# Patient Record
Sex: FEMALE | Race: WHITE | NOT HISPANIC OR LATINO
[De-identification: names, ages, dates, MRNs, and addresses within clinical notes are randomized per-mention and may not be internally consistent; named-entity substitution may affect disease eponyms.]

---

## 2018-05-01 PROBLEM — Z00.00 ENCOUNTER FOR PREVENTIVE HEALTH EXAMINATION: Status: ACTIVE | Noted: 2018-05-01

## 2018-05-02 ENCOUNTER — APPOINTMENT (OUTPATIENT)
Dept: DERMATOLOGY | Facility: CLINIC | Age: 68
End: 2018-05-02
Payer: MEDICARE

## 2018-05-02 PROCEDURE — 99214 OFFICE O/P EST MOD 30 MIN: CPT | Mod: 25

## 2018-05-02 PROCEDURE — 17110 DESTRUCTION B9 LES UP TO 14: CPT

## 2020-08-19 ENCOUNTER — APPOINTMENT (OUTPATIENT)
Dept: OBGYN | Facility: CLINIC | Age: 70
End: 2020-08-19
Payer: MEDICARE

## 2020-08-19 VITALS
SYSTOLIC BLOOD PRESSURE: 154 MMHG | BODY MASS INDEX: 27.79 KG/M2 | WEIGHT: 151 LBS | HEIGHT: 62 IN | DIASTOLIC BLOOD PRESSURE: 89 MMHG

## 2020-08-19 DIAGNOSIS — Z83.3 FAMILY HISTORY OF DIABETES MELLITUS: ICD-10-CM

## 2020-08-19 DIAGNOSIS — Z82.49 FAMILY HISTORY OF ISCHEMIC HEART DISEASE AND OTHER DISEASES OF THE CIRCULATORY SYSTEM: ICD-10-CM

## 2020-08-19 DIAGNOSIS — Z86.39 PERSONAL HISTORY OF OTHER ENDOCRINE, NUTRITIONAL AND METABOLIC DISEASE: ICD-10-CM

## 2020-08-19 DIAGNOSIS — Z87.42 PERSONAL HISTORY OF OTHER DISEASES OF THE FEMALE GENITAL TRACT: ICD-10-CM

## 2020-08-19 DIAGNOSIS — I10 ESSENTIAL (PRIMARY) HYPERTENSION: ICD-10-CM

## 2020-08-19 DIAGNOSIS — Z78.9 OTHER SPECIFIED HEALTH STATUS: ICD-10-CM

## 2020-08-19 PROCEDURE — 99203 OFFICE O/P NEW LOW 30 MIN: CPT | Mod: 25

## 2020-08-19 PROCEDURE — G0101: CPT

## 2020-08-23 ENCOUNTER — TRANSCRIPTION ENCOUNTER (OUTPATIENT)
Age: 70
End: 2020-08-23

## 2020-08-25 LAB — CYTOLOGY CVX/VAG DOC THIN PREP: ABNORMAL

## 2020-11-12 PROBLEM — Z87.42 HISTORY OF ABNORMAL CERVICAL PAP SMEAR: Status: RESOLVED | Noted: 2020-08-19 | Resolved: 2020-11-12

## 2020-11-12 PROBLEM — Z86.39 HISTORY OF HYPERCHOLESTEROLEMIA: Status: RESOLVED | Noted: 2020-08-19 | Resolved: 2020-11-12

## 2020-11-12 NOTE — HISTORY OF PRESENT ILLNESS
[FreeTextEntry1] : 70 year old female presents for wwe.  She is a new patient to our practice.  Her last gyn exam was in .  She is unsure of the age of menarche.  Menopause was in her 50's.  She denies menopausal symptoms.  She denies bowel symptoms.  She does admit to stress incontinence.  She denies further work up or treatment at this time.  She has a history of an abnormal pap and cryosurgery to her cervix.  She denies ovarian cysts, fibroids, endometriosis, STD's or abnormal pap's.  Her last mammo and dexa were in .  She declines further bone density testing as she would not take meds.  Her last colonoscopy was in 2019 and she states she is due this year.    \par \par PMH is significant for HTN and hypercholesterolemia.  PSH is significant for a rotator cuff tear.  Family history is non-contributory.  She denies tobacco, alcohol and illicit drugs.  OB history  (FT VD x 3, one stillborn at 38 weeks).  Gyn history as above.  She takes crestor, probiotics and amlodipine.  NKDA.

## 2020-11-12 NOTE — PHYSICAL EXAM
[Appropriately responsive] : appropriately responsive [Alert] : alert [No Acute Distress] : no acute distress [No Lymphadenopathy] : no lymphadenopathy [Soft] : soft [Non-tender] : non-tender [Non-distended] : non-distended [No HSM] : No HSM [No Lesions] : no lesions [No Mass] : no mass [Oriented x3] : oriented x3 [Examination Of The Breasts] : a normal appearance [No Masses] : no breast masses were palpable [Labia Majora] : normal [Labia Minora] : normal [Atrophy] : atrophy [Dry Mucosa] : dry mucosa [Normal] : normal [Uterine Adnexae] : normal

## 2022-04-27 ENCOUNTER — APPOINTMENT (OUTPATIENT)
Dept: DERMATOLOGY | Facility: CLINIC | Age: 72
End: 2022-04-27
Payer: MEDICARE

## 2022-04-27 PROCEDURE — 99204 OFFICE O/P NEW MOD 45 MIN: CPT

## 2022-05-11 ENCOUNTER — APPOINTMENT (OUTPATIENT)
Dept: OBGYN | Facility: CLINIC | Age: 72
End: 2022-05-11
Payer: MEDICARE

## 2022-05-11 VITALS
HEIGHT: 62 IN | BODY MASS INDEX: 28.34 KG/M2 | SYSTOLIC BLOOD PRESSURE: 146 MMHG | DIASTOLIC BLOOD PRESSURE: 85 MMHG | WEIGHT: 154 LBS

## 2022-05-11 PROCEDURE — G0101: CPT

## 2022-06-10 NOTE — PLAN
[FreeTextEntry1] : 72 year old female wwe\par \par 1. Patient with history of abnormal pap's and cryosurgery to the cervix.  Will continue pap's every 2 years.  She is not yet due for her Pap.  She will return to the office in August for her Pap.\par 2. Rx screening mammo\par 3. Patient declines further bone density as she would not proceed with treatment\par 4. Due for screening colonoscopy\par 5. Counseled on stress incontinence.  Declines further work up.  Counseled on kegel exercises.  Counseled on pelvic floor PT.\par 6.  Cystocele and rectocele noted on pelvic exam.  She is currently asymptomatic and declines further treatment.\par 7.  Annual exam in 1 year

## 2022-06-10 NOTE — HISTORY OF PRESENT ILLNESS
[FreeTextEntry1] : 72 year old female presents for wwe.  She has no complaints today.  Menopause was in her 50's.  She denies menopausal symptoms.  She denies bowel symptoms.  She does admit to stress incontinence.  She denies further work up or treatment at this time.  She has a history of an abnormal pap and cryosurgery to her cervix.  She has no other significant gyn history.  She is due for her mammo.  She declines further bone density testing as she would not take meds.  Her last colonoscopy was in 2019 and she states she is overdue.  She would like to continue Paps every 2 years as she has a history of abnormal Paps and cryosurgery to her cervix.  It has not yet been 2 years since she had her Pap done.  We discussed this with the billing department today and it would not be covered.  She will come back in August to have her Pap done.   \par \par PMH is significant for HTN and hypercholesterolemia.  PSH is significant for a rotator cuff tear.  Family history is non-contributory.  She denies tobacco, alcohol and illicit drugs.  OB history  (FT VD x 3, one stillborn at 38 weeks).  Gyn history as above.  She takes crestor, probiotics and amlodipine.  NKDA.

## 2022-06-10 NOTE — PHYSICAL EXAM
[Chaperone Declined] : Patient declined chaperone [Appropriately responsive] : appropriately responsive [Alert] : alert [No Acute Distress] : no acute distress [No Lymphadenopathy] : no lymphadenopathy [Regular Rate Rhythm] : regular rate rhythm [No Murmurs] : no murmurs [Clear to Auscultation B/L] : clear to auscultation bilaterally [Soft] : soft [Non-tender] : non-tender [Non-distended] : non-distended [No HSM] : No HSM [No Lesions] : no lesions [No Mass] : no mass [Oriented x3] : oriented x3 [Examination Of The Breasts] : a normal appearance [No Masses] : no breast masses were palpable [Labia Majora] : normal [Labia Minora] : normal [Atrophy] : atrophy [Cystocele] : a cystocele [Dry Mucosa] : dry mucosa [Rectocele] : a rectocele [Normal] : normal [Uterine Adnexae] : normal

## 2022-06-17 ENCOUNTER — APPOINTMENT (OUTPATIENT)
Dept: ORTHOPEDIC SURGERY | Facility: CLINIC | Age: 72
End: 2022-06-17
Payer: MEDICARE

## 2022-06-17 VITALS
HEART RATE: 91 BPM | DIASTOLIC BLOOD PRESSURE: 87 MMHG | BODY MASS INDEX: 28.34 KG/M2 | SYSTOLIC BLOOD PRESSURE: 137 MMHG | WEIGHT: 154 LBS | HEIGHT: 62 IN

## 2022-06-17 DIAGNOSIS — M16.11 UNILATERAL PRIMARY OSTEOARTHRITIS, RIGHT HIP: ICD-10-CM

## 2022-06-17 DIAGNOSIS — M25.552 PAIN IN LEFT HIP: ICD-10-CM

## 2022-06-17 PROCEDURE — 73502 X-RAY EXAM HIP UNI 2-3 VIEWS: CPT | Mod: RT

## 2022-06-17 PROCEDURE — 99205 OFFICE O/P NEW HI 60 MIN: CPT

## 2022-06-17 NOTE — ADDENDUM
[FreeTextEntry1] : This note was authored by Suhas Paul working as a medical scribe for Dr. Duane Negron. The note was reviewed, edited, and revised by Dr. Duane Negron whom is in agreement with the exam findings, imaging findings, and treatment plan. 06/17/2022

## 2022-06-17 NOTE — PHYSICAL EXAM
[de-identified] : The patient appears well nourished  and in no apparent distress.  The patient is alert and oriented to person, place, and time.   Affect and mood appear normal. The head is normocephalic and atraumatic.  The eyes reveal normal sclera and extra ocular muscles are intact. The tongue is midline with no apparent lesions.  Skin shows normal turgor with no evidence of eczema or psoriasis.  No respiratory distress noted.  Sensation grossly intact.		  [de-identified] : Exam of the left hip shows hip flexion of 80 degrees, hip external rotation of 20 degrees, hip internal rotation of 5 degrees,hip abduction of less than 10 degrees. The patient walks with and antalgic gait. The patient's left leg is 1 cm shorter than her right.\par  5/5 motor strength bilaterally distally. Sensation intact distally.		  [de-identified] : X-ray: AP view of the pelvis and 2 views of the left hip demonstrate bone on bone arthritis.

## 2022-06-17 NOTE — HISTORY OF PRESENT ILLNESS
[de-identified] : Ms. MARCO ANTONIO TELLEZ is a 72 year old female presenting for evaluation of longstanding left hip pain now worsening. Patient localizes the pain to the groin and notes it is worse with weightbearing activity including exercise. She also has worsening stiffness which is her biggest concern. She is limited in movements she can do while exercising. She can no longer put on socks and shoes. She has not had injections but has tried PT without improvement. Patient takes OTC NSAIDs and Tylenol without improvement.

## 2022-06-17 NOTE — CONSULT LETTER
[Dear  ___] : Dear  [unfilled], [Consult Letter:] : I had the pleasure of evaluating your patient, [unfilled]. [Please see my note below.] : Please see my note below. [Consult Closing:] : Thank you very much for allowing me to participate in the care of this patient.  If you have any questions, please do not hesitate to contact me. [Sincerely,] : Sincerely, [FreeTextEntry2] : HATTIE LACEY MD\par  [FreeTextEntry3] : Duane Negron MD\par Chief of Joint Replacement\par [facility] Replacement \par [facility]\par \par

## 2022-06-17 NOTE — DISCUSSION/SUMMARY
[de-identified] : MARCO ANTONIO TELLEZ is a 72 year female who presents with left hip bone on bone arthritis. Based upon the patients continued symptoms and failure to respond to conservative treatment I have recommended a left total hip replacement for the patient. A discussion was had with the patient regarding a left total hip replacement. Anterior and posterior exposures were discussed. For this patient an anterior approach is appropriate. A long discussion was had with the patient as what the total joint replacement would entail. A model was used to demonstrate the operation and to discuss bearing surfaces of the implants. The hospitalization and rehabilitation were discussed. The use of perioperative antibiotics and DVT prophylaxis were discussed. The risks, benefits and alternatives to surgical intervention were discussed at length with the patient. Specific risks discussed included: infection, wound breakdown, numbness and damage to nerves, tendon, muscle, arteries or other blood vessels, and the possibility of leg length discrepancy. The possibility of recurrent pain, no improvement in pain and actual worsening of the pain were also mentioned in conversation with the patient. Medical complications related to the patient's general medical health including deep vein thrombosis, pulmonary embolism, heart attack, stroke, death and other complications from anesthesia were discussed as well. The patient was told that we will take steps to minimize these risks by using sterile technique, antibiotics and DVT prophylaxis when appropriate and following the patient postoperatively in the clinic setting to monitor progress. The benefits of surgery were discussed with the patient including the potential to improve the current clinical condition through operative intervention. Alternatives to surgical intervention include continued conservative management which may yield less than optimal results in this particular patient. All questions were answered to the satisfaction of the patient. Models were used as an educational tool. We did discuss implant choices and fixation, with shared decision making with the patient.\par  \par We are planning for robotic assistance for the total hip replacement. We discussed that this will require placement of iliac crest pins in the contralateral iliac crest for pelvic bone registration to allow for robotic guidance for the surgery. We will utilize robotic assistance to improve accuracy of the implants, and accurate restoration of both leg lengths and femoral offset.

## 2022-07-22 ENCOUNTER — OUTPATIENT (OUTPATIENT)
Dept: OUTPATIENT SERVICES | Facility: HOSPITAL | Age: 72
LOS: 1 days | End: 2022-07-22

## 2022-07-22 ENCOUNTER — APPOINTMENT (OUTPATIENT)
Dept: CT IMAGING | Facility: CLINIC | Age: 72
End: 2022-07-22

## 2022-07-22 DIAGNOSIS — M16.11 UNILATERAL PRIMARY OSTEOARTHRITIS, RIGHT HIP: ICD-10-CM

## 2022-07-22 PROCEDURE — 73700 CT LOWER EXTREMITY W/O DYE: CPT | Mod: 26,LT

## 2022-08-09 ENCOUNTER — OUTPATIENT (OUTPATIENT)
Dept: OUTPATIENT SERVICES | Facility: HOSPITAL | Age: 72
LOS: 1 days | End: 2022-08-09
Payer: MEDICARE

## 2022-08-09 VITALS
OXYGEN SATURATION: 98 % | HEART RATE: 70 BPM | RESPIRATION RATE: 18 BRPM | TEMPERATURE: 98 F | HEIGHT: 62 IN | SYSTOLIC BLOOD PRESSURE: 120 MMHG | DIASTOLIC BLOOD PRESSURE: 60 MMHG | WEIGHT: 150.13 LBS

## 2022-08-09 DIAGNOSIS — Z98.890 OTHER SPECIFIED POSTPROCEDURAL STATES: Chronic | ICD-10-CM

## 2022-08-09 DIAGNOSIS — Z01.818 ENCOUNTER FOR OTHER PREPROCEDURAL EXAMINATION: ICD-10-CM

## 2022-08-09 DIAGNOSIS — M16.12 UNILATERAL PRIMARY OSTEOARTHRITIS, LEFT HIP: ICD-10-CM

## 2022-08-09 DIAGNOSIS — I10 ESSENTIAL (PRIMARY) HYPERTENSION: ICD-10-CM

## 2022-08-09 DIAGNOSIS — Z29.9 ENCOUNTER FOR PROPHYLACTIC MEASURES, UNSPECIFIED: ICD-10-CM

## 2022-08-09 DIAGNOSIS — Z91.89 OTHER SPECIFIED PERSONAL RISK FACTORS, NOT ELSEWHERE CLASSIFIED: ICD-10-CM

## 2022-08-09 LAB
A1C WITH ESTIMATED AVERAGE GLUCOSE RESULT: 5.8 % — HIGH (ref 4–5.6)
ANION GAP SERPL CALC-SCNC: 10 MMOL/L — SIGNIFICANT CHANGE UP (ref 5–17)
APTT BLD: 30.9 SEC — SIGNIFICANT CHANGE UP (ref 27.5–35.5)
BASOPHILS # BLD AUTO: 0.06 K/UL — SIGNIFICANT CHANGE UP (ref 0–0.2)
BASOPHILS NFR BLD AUTO: 0.7 % — SIGNIFICANT CHANGE UP (ref 0–2)
BLD GP AB SCN SERPL QL: SIGNIFICANT CHANGE UP
BUN SERPL-MCNC: 16.7 MG/DL — SIGNIFICANT CHANGE UP (ref 8–20)
CALCIUM SERPL-MCNC: 9.5 MG/DL — SIGNIFICANT CHANGE UP (ref 8.4–10.5)
CHLORIDE SERPL-SCNC: 99 MMOL/L — SIGNIFICANT CHANGE UP (ref 98–107)
CO2 SERPL-SCNC: 26 MMOL/L — SIGNIFICANT CHANGE UP (ref 22–29)
CREAT SERPL-MCNC: 0.85 MG/DL — SIGNIFICANT CHANGE UP (ref 0.5–1.3)
EGFR: 73 ML/MIN/1.73M2 — SIGNIFICANT CHANGE UP
EOSINOPHIL # BLD AUTO: 0.5 K/UL — SIGNIFICANT CHANGE UP (ref 0–0.5)
EOSINOPHIL NFR BLD AUTO: 6.1 % — HIGH (ref 0–6)
ESTIMATED AVERAGE GLUCOSE: 120 MG/DL — HIGH (ref 68–114)
GLUCOSE SERPL-MCNC: 81 MG/DL — SIGNIFICANT CHANGE UP (ref 70–99)
HCT VFR BLD CALC: 38.2 % — SIGNIFICANT CHANGE UP (ref 34.5–45)
HGB BLD-MCNC: 12.9 G/DL — SIGNIFICANT CHANGE UP (ref 11.5–15.5)
IMM GRANULOCYTES NFR BLD AUTO: 0.4 % — SIGNIFICANT CHANGE UP (ref 0–1.5)
INR BLD: 0.97 RATIO — SIGNIFICANT CHANGE UP (ref 0.88–1.16)
LYMPHOCYTES # BLD AUTO: 1.59 K/UL — SIGNIFICANT CHANGE UP (ref 1–3.3)
LYMPHOCYTES # BLD AUTO: 19.4 % — SIGNIFICANT CHANGE UP (ref 13–44)
MCHC RBC-ENTMCNC: 30.9 PG — SIGNIFICANT CHANGE UP (ref 27–34)
MCHC RBC-ENTMCNC: 33.8 GM/DL — SIGNIFICANT CHANGE UP (ref 32–36)
MCV RBC AUTO: 91.4 FL — SIGNIFICANT CHANGE UP (ref 80–100)
MONOCYTES # BLD AUTO: 0.74 K/UL — SIGNIFICANT CHANGE UP (ref 0–0.9)
MONOCYTES NFR BLD AUTO: 9 % — SIGNIFICANT CHANGE UP (ref 2–14)
MRSA PCR RESULT.: SIGNIFICANT CHANGE UP
NEUTROPHILS # BLD AUTO: 5.26 K/UL — SIGNIFICANT CHANGE UP (ref 1.8–7.4)
NEUTROPHILS NFR BLD AUTO: 64.4 % — SIGNIFICANT CHANGE UP (ref 43–77)
PLATELET # BLD AUTO: 384 K/UL — SIGNIFICANT CHANGE UP (ref 150–400)
POTASSIUM SERPL-MCNC: 5 MMOL/L — SIGNIFICANT CHANGE UP (ref 3.5–5.3)
POTASSIUM SERPL-SCNC: 5 MMOL/L — SIGNIFICANT CHANGE UP (ref 3.5–5.3)
PROTHROM AB SERPL-ACNC: 11.3 SEC — SIGNIFICANT CHANGE UP (ref 10.5–13.4)
RBC # BLD: 4.18 M/UL — SIGNIFICANT CHANGE UP (ref 3.8–5.2)
RBC # FLD: 12.8 % — SIGNIFICANT CHANGE UP (ref 10.3–14.5)
S AUREUS DNA NOSE QL NAA+PROBE: DETECTED
SODIUM SERPL-SCNC: 135 MMOL/L — SIGNIFICANT CHANGE UP (ref 135–145)
WBC # BLD: 8.18 K/UL — SIGNIFICANT CHANGE UP (ref 3.8–10.5)
WBC # FLD AUTO: 8.18 K/UL — SIGNIFICANT CHANGE UP (ref 3.8–10.5)

## 2022-08-09 PROCEDURE — 93010 ELECTROCARDIOGRAM REPORT: CPT

## 2022-08-09 PROCEDURE — 93005 ELECTROCARDIOGRAM TRACING: CPT

## 2022-08-09 PROCEDURE — G0463: CPT

## 2022-08-09 RX ORDER — MUPIROCIN 20 MG/G
1 OINTMENT TOPICAL
Qty: 1 | Refills: 0
Start: 2022-08-09 | End: 2022-08-13

## 2022-08-09 NOTE — H&P PST ADULT - ASSESSMENT
CAPRINI SCORE    AGE RELATED RISK FACTORS                                                             [ ] Age 41-60 years                                            (1 Point)  [ ] Age: 61-74 years                                           (2 Points)                 [ ] Age= 75 years                                                (3 Points)             DISEASE RELATED RISK FACTORS                                                       [ ] Edema in the lower extremities                 (1 Point)                     [ ] Varicose veins                                               (1 Point)                                 [ ] BMI > 25 Kg/m2                                            (1 Point)                                  [ ] Serious infection (ie PNA)                            (1 Point)                     [ ] Lung disease ( COPD, Emphysema)            (1 Point)                                                                          [ ] Acute myocardial infarction                         (1 Point)                  [ ] Congestive heart failure (in the previous month)  (1 Point)         [ ] Inflammatory bowel disease                            (1 Point)                  [ ] Central venous access, PICC or Port               (2 points)       (within the last month)                                                                [ ] Stroke (in the previous month)                        (5 Points)    [ ] Previous or present malignancy                       (2 points)                                                                                                                                                         HEMATOLOGY RELATED FACTORS                                                         [ ] Prior episodes of VTE                                     (3 Points)                     [ ] Positive family history for VTE                      (3 Points)                  [ ] Prothrombin 81008 A                                     (3 Points)                     [ ] Factor V Leiden                                                (3 Points)                        [ ] Lupus anticoagulants                                      (3 Points)                                                           [ ] Anticardiolipin antibodies                              (3 Points)                                                       [ ] High homocysteine in the blood                   (3 Points)                                             [ ] Other congenital or acquired thrombophilia      (3 Points)                                                [ ] Heparin induced thrombocytopenia                  (3 Points)                                        MOBILITY RELATED FACTORS  [ ] Bed rest                                                         (1 Point)  [ ] Plaster cast                                                    (2 points)  [ ] Bed bound for more than 72 hours           (2 Points)    GENDER SPECIFIC FACTORS  [ ] Pregnancy or had a baby within the last month   (1 Point)  [ ] Post-partum < 6 weeks                                   (1 Point)  [ ] Hormonal therapy  or oral contraception   (1 Point)  [ ] History of pregnancy complications              (1 point)  [ ] Unexplained or recurrent              (1 Point)    OTHER RISK FACTORS                                           (1 Point)  [ ] BMI >40, smoking, diabetes requiring insulin, chemotherapy  blood transfusions and length of surgery over 2 hours    SURGERY RELATED RISK FACTORS  [ ]  Section within the last month     (1 Point)  [ ] Minor surgery                                                  (1 Point)  [ ] Arthroscopic surgery                                       (2 Points)  [ ] Planned major surgery lasting more            (2 Points)      than 45 minutes     [ ] Elective hip or knee joint replacement       (5 points)       surgery                                                TRAUMA RELATED RISK FACTORS  [ ] Fracture of the hip, pelvis, or leg                       (5 Points)  [ ] Spinal cord injury resulting in paralysis             (5 points)       (in the previous month)    [ ] Paralysis  (less than 1 month)                             (5 Points)  [ ] Multiple Trauma within 1 month                        (5 Points)    Total Score [        ]    Caprini Score 0-2: Low Risk, NO VTE prophylaxis required for most patients, encourage ambulation  Caprini Score 3-6: Moderate Risk , pharmacologic VTE prophylaxis is indicated for most patients (in the absence of contraindications)  Caprini Score Greater than or =7: High risk, pharmocologic VTE prophylaxis indicated for most patients (in the absence of contraindications)                  OPIOID RISK TOOL    CLAIRE EACH BOX THAT APPLIES AND ADD TOTALS AT THE END    FAMILY HISTORY OF SUBSTANCE ABUSE                 FEMALE         MALE                                                Alcohol                             [  ]1 pt          [  ]3pts                                               Illegal Durgs                     [  ]2 pts        [  ]3pts                                               Rx Drugs                           [  ]4 pts        [  ]4 pts    PERSONAL HISTORY OF SUBSTANCE ABUSE                                                                                          Alcohol                             [  ]3 pts       [  ]3 pts                                               Illegal Durgs                     [  ]4 pts        [  ]4 pts                                               Rx Drugs                           [  ]5 pts        [  ]5 pts    AGE BETWEEN 16-45 YEARS                                      [  ]1 pt         [  ]1 pt    HISTORY OF PREADOLESCENT   SEXUAL ABUSE                                                             [  ]3 pts        [  ]0pts    PSYCHOLOGICAL DISEASE                     ADD, OCD, Bipolar, Schizophrenia        [  ]2 pts         [  ]2 pts                      Depression                                               [  ]1 pt           [  ]1 pt           SCORING TOTAL   (add numbers and type here)              (***)                                     A score of 3 or lower indicated LOW risk for future opiod abuse  A score of 4 to 7 indicated moderate risk for future opiod abuse  A score of 8 or higher indicates a high risk for opiod abuse             CAPRINI SCORE    AGE RELATED RISK FACTORS                                                             [ ] Age 41-60 years                                            (1 Point)  [ ] Age: 61-74 years                                           (2 Points)                 [ ] Age= 75 years                                                (3 Points)             DISEASE RELATED RISK FACTORS                                                       [ ] Edema in the lower extremities                 (1 Point)                     [ ] Varicose veins                                               (1 Point)                                 [ ] BMI > 25 Kg/m2                                            (1 Point)                                  [ ] Serious infection (ie PNA)                            (1 Point)                     [ ] Lung disease ( COPD, Emphysema)            (1 Point)                                                                          [ ] Acute myocardial infarction                         (1 Point)                  [ ] Congestive heart failure (in the previous month)  (1 Point)         [ ] Inflammatory bowel disease                            (1 Point)                  [ ] Central venous access, PICC or Port               (2 points)       (within the last month)                                                                [ ] Stroke (in the previous month)                        (5 Points)    [ ] Previous or present malignancy                       (2 points)                                                                                                                                                         HEMATOLOGY RELATED FACTORS                                                         [ ] Prior episodes of VTE                                     (3 Points)                     [ ] Positive family history for VTE                      (3 Points)                  [ ] Prothrombin 90662 A                                     (3 Points)                     [ ] Factor V Leiden                                                (3 Points)                        [ ] Lupus anticoagulants                                      (3 Points)                                                           [ ] Anticardiolipin antibodies                              (3 Points)                                                       [ ] High homocysteine in the blood                   (3 Points)                                             [ ] Other congenital or acquired thrombophilia      (3 Points)                                                [ ] Heparin induced thrombocytopenia                  (3 Points)                                        MOBILITY RELATED FACTORS  [ ] Bed rest                                                         (1 Point)  [ ] Plaster cast                                                    (2 points)  [ ] Bed bound for more than 72 hours           (2 Points)    GENDER SPECIFIC FACTORS  [ ] Pregnancy or had a baby within the last month   (1 Point)  [ ] Post-partum < 6 weeks                                   (1 Point)  [ ] Hormonal therapy  or oral contraception   (1 Point)  [ ] History of pregnancy complications              (1 point)  [ ] Unexplained or recurrent              (1 Point)    OTHER RISK FACTORS                                           (1 Point)  [ ] BMI >40, smoking, diabetes requiring insulin, chemotherapy  blood transfusions and length of surgery over 2 hours    SURGERY RELATED RISK FACTORS  [ ]  Section within the last month     (1 Point)  [ ] Minor surgery                                                  (1 Point)  [ ] Arthroscopic surgery                                       (2 Points)  [ ] Planned major surgery lasting more            (2 Points)      than 45 minutes     [ ] Elective hip or knee joint replacement       (5 points)       surgery                                                TRAUMA RELATED RISK FACTORS  [ ] Fracture of the hip, pelvis, or leg                       (5 Points)  [ ] Spinal cord injury resulting in paralysis             (5 points)       (in the previous month)    [ ] Paralysis  (less than 1 month)                             (5 Points)  [ ] Multiple Trauma within 1 month                        (5 Points)    Total Score [        ]    Caprini Score 0-2: Low Risk, NO VTE prophylaxis required for most patients, encourage ambulation  Caprini Score 3-6: Moderate Risk , pharmacologic VTE prophylaxis is indicated for most patients (in the absence of contraindications)  Caprini Score Greater than or =7: High risk, pharmocologic VTE prophylaxis indicated for most patients (in the absence of contraindications)                  OPIOID RISK TOOL    CLAIRE EACH BOX THAT APPLIES AND ADD TOTALS AT THE END    FAMILY HISTORY OF SUBSTANCE ABUSE                 FEMALE         MALE                                                Alcohol                             [  ]1 pt          [  ]3pts                                               Illegal Durgs                     [  ]2 pts        [  ]3pts                                               Rx Drugs                           [  ]4 pts        [  ]4 pts    PERSONAL HISTORY OF SUBSTANCE ABUSE                                                                                          Alcohol                             [  ]3 pts       [  ]3 pts                                               Illegal Durgs                     [  ]4 pts        [  ]4 pts                                               Rx Drugs                           [  ]5 pts        [  ]5 pts    AGE BETWEEN 16-45 YEARS                                      [  ]1 pt         [  ]1 pt    HISTORY OF PREADOLESCENT   SEXUAL ABUSE                                                             [  ]3 pts        [  ]0pts    PSYCHOLOGICAL DISEASE                     ADD, OCD, Bipolar, Schizophrenia        [  ]2 pts         [  ]2 pts                      Depression                                               [  ]1 pt           [  ]1 pt           SCORING TOTAL   (add numbers and type here)              (***)                                     A score of 3 or lower indicated LOW risk for future opiod abuse  A score of 4 to 7 indicated moderate risk for future opiod abuse  A score of 8 or higher indicates a high risk for opiod abuse      72 year old female pmhx of HTN, HLD, presenting for evaluation of longstanding left hip pain now worsening since 2022. She describes the pain as a stiffness, 2/10 in severity, worse with standing from a prolonged sitting position, patient localizes the pain to the groin. She is limited in movements she can do while exercising. She can no longer put on socks and shoes. She has tried PT without improvement. CT left hip 22 revealed Severe left hip osteoarthritis with probable joint bodies. Osteoarthritic changes within the right hip, partially evaluated. Partially evaluated degenerative changes within the lower lumbar spine and pubic symphysis. Patient is scheduled for left total hip replacement direct anterior approach with LAW robot on 22 with Dr Negron. Patient educated on surgical scrub, COVID testing, preadmission instructions, medical clearance and day of procedure medications, verbalizes understanding. Pt instructed to stop vitamins/supplements/herbal medications/ASA/NSAIDS for one week prior to surgery and discuss with PMD. Patient was given information on joint class, joint book provided, ERP protocol reviewed with patient, MSSA/MRSA swabbed in PST, results pending   Medical evaluation is pending        CAPRINI SCORE    AGE RELATED RISK FACTORS                                                             [ ] Age 41-60 years                                            (1 Point)  [x ] Age: 61-74 years                                           (2 Points)                 [ ] Age= 75 years                                                (3 Points)             DISEASE RELATED RISK FACTORS                                                       [ ] Edema in the lower extremities                 (1 Point)                     [ ] Varicose veins                                               (1 Point)                                 [x ] BMI > 25 Kg/m2                                            (1 Point)                                  [ ] Serious infection (ie PNA)                            (1 Point)                     [ ] Lung disease ( COPD, Emphysema)            (1 Point)                                                                          [ ] Acute myocardial infarction                         (1 Point)                  [ ] Congestive heart failure (in the previous month)  (1 Point)         [ ] Inflammatory bowel disease                            (1 Point)                  [ ] Central venous access, PICC or Port               (2 points)       (within the last month)                                                                [ ] Stroke (in the previous month)                        (5 Points)    [ ] Previous or present malignancy                       (2 points)                                                                                                                                                         HEMATOLOGY RELATED FACTORS                                                         [ ] Prior episodes of VTE                                     (3 Points)                     [ ] Positive family history for VTE                      (3 Points)                  [ ] Prothrombin 00990 A                                     (3 Points)                     [ ] Factor V Leiden                                                (3 Points)                        [ ] Lupus anticoagulants                                      (3 Points)                                                           [ ] Anticardiolipin antibodies                              (3 Points)                                                       [ ] High homocysteine in the blood                   (3 Points)                                             [ ] Other congenital or acquired thrombophilia      (3 Points)                                                [ ] Heparin induced thrombocytopenia                  (3 Points)                                        MOBILITY RELATED FACTORS  [ ] Bed rest                                                         (1 Point)  [ ] Plaster cast                                                    (2 points)  [ ] Bed bound for more than 72 hours           (2 Points)    GENDER SPECIFIC FACTORS  [ ] Pregnancy or had a baby within the last month   (1 Point)  [ ] Post-partum < 6 weeks                                   (1 Point)  [ ] Hormonal therapy  or oral contraception   (1 Point)  [ ] History of pregnancy complications              (1 point)  [ ] Unexplained or recurrent              (1 Point)    OTHER RISK FACTORS                                           (1 Point)  [ ] BMI >40, smoking, diabetes requiring insulin, chemotherapy  blood transfusions and length of surgery over 2 hours    SURGERY RELATED RISK FACTORS  [ ]  Section within the last month     (1 Point)  [ ] Minor surgery                                                  (1 Point)  [ ] Arthroscopic surgery                                       (2 Points)  [ ] Planned major surgery lasting more            (2 Points)      than 45 minutes     [x ] Elective hip or knee joint replacement       (5 points)       surgery                                                TRAUMA RELATED RISK FACTORS  [ ] Fracture of the hip, pelvis, or leg                       (5 Points)  [ ] Spinal cord injury resulting in paralysis             (5 points)       (in the previous month)    [ ] Paralysis  (less than 1 month)                             (5 Points)  [ ] Multiple Trauma within 1 month                        (5 Points)    Total Score [    8    ]    Caprini Score 0-2: Low Risk, NO VTE prophylaxis required for most patients, encourage ambulation  Caprini Score 3-6: Moderate Risk , pharmacologic VTE prophylaxis is indicated for most patients (in the absence of contraindications)  Caprini Score Greater than or =7: High risk, pharmocologic VTE prophylaxis indicated for most patients (in the absence of contraindications)                  OPIOID RISK TOOL    CLAIRE EACH BOX THAT APPLIES AND ADD TOTALS AT THE END    FAMILY HISTORY OF SUBSTANCE ABUSE                 FEMALE         MALE                                                Alcohol                             [  ]1 pt          [  ]3pts                                               Illegal Durgs                     [  ]2 pts        [  ]3pts                                               Rx Drugs                           [  ]4 pts        [  ]4 pts    PERSONAL HISTORY OF SUBSTANCE ABUSE                                                                                          Alcohol                             [  ]3 pts       [  ]3 pts                                               Illegal Durgs                     [  ]4 pts        [  ]4 pts                                               Rx Drugs                           [  ]5 pts        [  ]5 pts    AGE BETWEEN 16-45 YEARS                                      [  ]1 pt         [  ]1 pt    HISTORY OF PREADOLESCENT   SEXUAL ABUSE                                                             [  ]3 pts        [  ]0pts    PSYCHOLOGICAL DISEASE                     ADD, OCD, Bipolar, Schizophrenia        [  ]2 pts         [  ]2 pts                      Depression                                               [  ]1 pt           [  ]1 pt           SCORING TOTAL   0                                   A score of 3 or lower indicated LOW risk for future opiod abuse  A score of 4 to 7 indicated moderate risk for future opiod abuse  A score of 8 or higher indicates a high risk for opiod abuse      72 year old female pmhx of HTN, HLD, presenting for evaluation of longstanding left hip pain now worsening since 2022. She describes the pain as a stiffness, 2/10 in severity, worse with standing from a prolonged sitting position, patient localizes the pain to the groin. She is limited in movements she can do while exercising. She can no longer put on socks and shoes. She has tried PT without improvement. CT left hip 22 revealed Severe left hip osteoarthritis with probable joint bodies. Osteoarthritic changes within the right hip, partially evaluated. Partially evaluated degenerative changes within the lower lumbar spine and pubic symphysis. Patient is scheduled for left total hip replacement direct anterior approach with LAW robot on 22 with Dr Negron. Patient educated on surgical scrub, COVID testing, preadmission instructions, medical clearance and day of procedure medications, verbalizes understanding. Pt instructed to stop vitamins/supplements/herbal medications/ASA/NSAIDS for one week prior to surgery and discuss with PMD. Patient was given information on joint class, joint book provided, ERP protocol reviewed with patient, MSSA/MRSA swabbed in PST, results pending

## 2022-08-09 NOTE — H&P PST ADULT - NSICDXPASTMEDICALHX_GEN_ALL_CORE_FT
PAST MEDICAL HISTORY:  Hypertension     Unilateral osteoarthritis of hip, left      PAST MEDICAL HISTORY:  Hyperlipemia     Hypertension      (normal spontaneous vaginal delivery)     Unilateral osteoarthritis of hip, left

## 2022-08-09 NOTE — H&P PST ADULT - GASTROINTESTINAL
negative soft/nontender/nondistended/normal active bowel sounds/no guarding/no rigidity/no organomegaly

## 2022-08-09 NOTE — H&P PST ADULT - PROBLEM SELECTOR PLAN 1
Patient is scheduled for left total hip replacement direct anterior approach with LAW robot on 8/29/22 with Dr Negron. Medical evaluation is pending

## 2022-08-09 NOTE — H&P PST ADULT - ACTIVITY
unable to exercise due to pain, formerly playing tennis 3x a week, unable to play currently due to pain

## 2022-08-09 NOTE — H&P PST ADULT - MUSCULOSKELETAL
details… no calf tenderness/decreased ROM due to pain/strength 5/5 bilateral upper extremities/strength 5/5 bilateral lower extremities/abnormal gait

## 2022-08-09 NOTE — H&P PST ADULT - PROBLEM SELECTOR PLAN 2
/60 continue amlodipine as prescribed, pt takes at night, take PM prior to surgery. Medical evaluation is pending

## 2022-08-09 NOTE — H&P PST ADULT - NSICDXFAMILYHX_GEN_ALL_CORE_FT
FAMILY HISTORY:  Sibling  Still living? Unknown  FH: myocardial infarction, Age at diagnosis: Age Unknown

## 2022-08-09 NOTE — H&P PST ADULT - HEART RATE (BEATS/MIN)
70 COVID-19 swab obtained and sent to lab. Pt sitting comfortably in NAD. Will D/C and patient will F/U with results.

## 2022-08-09 NOTE — H&P PST ADULT - HISTORY OF PRESENT ILLNESS
72 year old female presenting for evaluation of longstanding left hip pain now worsening. Patient localizes the pain to the groin and notes it is worse with weightbearing activity including exercise. She also has worsening stiffness which is her biggest concern. She is limited in movements she can do while exercising. She can no longer put on socks and shoes. She has not had injections but has tried PT without improvement. Patient takes OTC NSAIDs and Tylenol without improvement.   Patient is scheduled for left total hip replacement direct anterior approach with LAW robot on 8/29/22 with Dr Negron.  Medical evaluation is pending     CT left hip 7/26/22 revealed Severe left hip osteoarthritis with probable joint bodies. Osteoarthritic   changes within the right hip, partially evaluated. Partially evaluated   degenerative changes within the lower lumbar spine and pubic symphysis.   Colonic diverticulosis. Vascular calcifications noted. 72 year old female pmhx of HTN, HLD, presenting for evaluation of longstanding left hip pain now worsening since Feb 2022. She describes the pain as a stiffness, 2/10 in severity, worse with standing from a prolonged sitting position, patient localizes the pain to the groin. She is limited in movements she can do while exercising. She can no longer put on socks and shoes. She has tried PT without improvement. CT left hip 7/26/22 revealed Severe left hip osteoarthritis with probable joint bodies. Osteoarthritic changes within the right hip, partially evaluated. Partially evaluated degenerative changes within the lower lumbar spine and pubic symphysis. Patient is scheduled for left total hip replacement direct anterior approach with LAW robot on 8/29/22 with Dr Negron.  Medical evaluation is pending

## 2022-08-09 NOTE — H&P PST ADULT - CARDIOVASCULAR
negative regular rate and rhythm/S1 S2 present/no gallops/no rub/no murmur/no JVD/normal PMI/no pedal edema

## 2022-08-28 ENCOUNTER — FORM ENCOUNTER (OUTPATIENT)
Age: 72
End: 2022-08-28

## 2022-08-28 ENCOUNTER — TRANSCRIPTION ENCOUNTER (OUTPATIENT)
Age: 72
End: 2022-08-28

## 2022-08-29 ENCOUNTER — OUTPATIENT (OUTPATIENT)
Dept: OUTPATIENT SERVICES | Facility: HOSPITAL | Age: 72
LOS: 1 days | End: 2022-08-29
Payer: MEDICARE

## 2022-08-29 ENCOUNTER — APPOINTMENT (OUTPATIENT)
Dept: ORTHOPEDIC SURGERY | Facility: HOSPITAL | Age: 72
End: 2022-08-29

## 2022-08-29 ENCOUNTER — TRANSCRIPTION ENCOUNTER (OUTPATIENT)
Age: 72
End: 2022-08-29

## 2022-08-29 DIAGNOSIS — Z98.890 OTHER SPECIFIED POSTPROCEDURAL STATES: Chronic | ICD-10-CM

## 2022-08-29 PROCEDURE — 0055T BONE SRGRY CMPTR CT/MRI IMAG: CPT | Mod: AS,LT

## 2022-08-29 PROCEDURE — 27130 TOTAL HIP ARTHROPLASTY: CPT | Mod: AS,LT

## 2022-08-29 DEVICE — SHELL ACET TRIDENT II D 48MM: Type: IMPLANTABLE DEVICE | Status: FUNCTIONAL

## 2022-08-29 DEVICE — PIN SLFTP STRL 4X170MM: Type: IMPLANTABLE DEVICE | Status: FUNCTIONAL

## 2022-08-29 DEVICE — CEMENT BONE PALACOS R W/O GENTAMICIN 1X40: Type: IMPLANTABLE DEVICE | Status: FUNCTIONAL

## 2022-08-29 DEVICE — BONE WAX: Type: IMPLANTABLE DEVICE | Status: FUNCTIONAL

## 2022-08-29 DEVICE — LINER ACET TRIDENT X3 0 DEG 36MM D: Type: IMPLANTABLE DEVICE | Status: FUNCTIONAL

## 2022-08-29 DEVICE — FEM HD CERAMIC ART BIOLOX DELTA: Type: IMPLANTABLE DEVICE | Status: FUNCTIONAL

## 2022-08-29 DEVICE — SCREW HEX LO PROF 6.5X40MM: Type: IMPLANTABLE DEVICE | Status: FUNCTIONAL

## 2022-08-29 DEVICE — SCREW HEX LO PROF 6.5X15MM: Type: IMPLANTABLE DEVICE | Status: FUNCTIONAL

## 2022-08-29 DEVICE — FEM TIB CHECKPOINT STRL: Type: IMPLANTABLE DEVICE | Status: FUNCTIONAL

## 2022-08-29 DEVICE — HEX CHECKPOINT IMPACTION STRL 3.5MM: Type: IMPLANTABLE DEVICE | Status: FUNCTIONAL

## 2022-08-29 DEVICE — STEM FEM ACTIS TAPR STD COLLAR SZ 4: Type: IMPLANTABLE DEVICE | Status: FUNCTIONAL

## 2022-08-29 RX ORDER — AMLODIPINE BESYLATE 2.5 MG/1
1 TABLET ORAL
Qty: 0 | Refills: 0 | DISCHARGE

## 2022-08-29 RX ORDER — L.ACIDOPH/B.ANIMALIS/B.LONGUM 15B CELL
1 CAPSULE ORAL
Qty: 0 | Refills: 0 | DISCHARGE

## 2022-08-29 RX ORDER — ROSUVASTATIN CALCIUM 5 MG/1
1 TABLET ORAL
Qty: 0 | Refills: 0 | DISCHARGE

## 2022-08-30 ENCOUNTER — TRANSCRIPTION ENCOUNTER (OUTPATIENT)
Age: 72
End: 2022-08-30

## 2022-08-30 PROCEDURE — 97163 PT EVAL HIGH COMPLEX 45 MIN: CPT

## 2022-08-30 PROCEDURE — 97110 THERAPEUTIC EXERCISES: CPT

## 2022-08-30 PROCEDURE — 27130 TOTAL HIP ARTHROPLASTY: CPT | Mod: LT

## 2022-08-30 PROCEDURE — 97116 GAIT TRAINING THERAPY: CPT

## 2022-08-30 PROCEDURE — C1713: CPT

## 2022-08-30 PROCEDURE — 82962 GLUCOSE BLOOD TEST: CPT

## 2022-08-30 PROCEDURE — S2900: CPT

## 2022-08-30 PROCEDURE — C1889: CPT

## 2022-08-30 PROCEDURE — 73502 X-RAY EXAM HIP UNI 2-3 VIEWS: CPT

## 2022-08-30 PROCEDURE — C1776: CPT

## 2022-08-30 PROCEDURE — 36415 COLL VENOUS BLD VENIPUNCTURE: CPT

## 2022-09-02 DIAGNOSIS — M16.12 UNILATERAL PRIMARY OSTEOARTHRITIS, LEFT HIP: ICD-10-CM

## 2022-09-02 PROBLEM — I10 ESSENTIAL (PRIMARY) HYPERTENSION: Chronic | Status: ACTIVE | Noted: 2022-08-09

## 2022-09-02 PROBLEM — E78.5 HYPERLIPIDEMIA, UNSPECIFIED: Chronic | Status: ACTIVE | Noted: 2022-08-09

## 2022-09-06 ENCOUNTER — NON-APPOINTMENT (OUTPATIENT)
Age: 72
End: 2022-09-06

## 2022-09-06 LAB — SARS-COV-2 N GENE NPH QL NAA+PROBE: NOT DETECTED

## 2022-09-16 ENCOUNTER — APPOINTMENT (OUTPATIENT)
Dept: ORTHOPEDIC SURGERY | Facility: CLINIC | Age: 72
End: 2022-09-16

## 2022-09-16 VITALS
HEIGHT: 62 IN | DIASTOLIC BLOOD PRESSURE: 83 MMHG | HEART RATE: 90 BPM | BODY MASS INDEX: 28.16 KG/M2 | WEIGHT: 153 LBS | SYSTOLIC BLOOD PRESSURE: 147 MMHG

## 2022-09-16 PROCEDURE — 99024 POSTOP FOLLOW-UP VISIT: CPT

## 2022-09-16 PROCEDURE — 73502 X-RAY EXAM HIP UNI 2-3 VIEWS: CPT | Mod: LT

## 2022-09-16 NOTE — HISTORY OF PRESENT ILLNESS
[de-identified] : S/P: Left roboticassisted anterior total hip replacement with LAW. DOS: 8/29/22 [de-identified] : She  is doing well s/p left total hip replacement. She  completed in home physical therapy and is due to begin outpatient PT soon. She is taking nothing for pain and pain level is controlled. She  is taking aspirin for DVT ppx. Patient denies any fevers chills or constitutional symptoms. Patient denies any falls or Trauma. Overall patient is doing well.  [de-identified] : Exam of the left hip and contralateral iliac crest shows well healing incisions, hip flexion of 100 degrees, hip external rotation of 45 degrees, hip internal rotation of 25 degrees. Patient can perform a straight leg raise.		 \par  5/5 motor strength bilaterally distally. Sensation intact distally.		  [de-identified] :  X-ray: AP of the pelvis and 2 views of the left hip demonstrate a left total hip arthroplasty in stable position, with no evidence of fracture, loosening, or dislocation.  [de-identified] : The patient is doing very well 3 weeks after left anterior total hip replacement. The patient will be transitioned to outpatient physical therapy and a prescription was given for that. The patient will take aspirin 325 mg twice per day for DVT prophylaxis for the next three weeks. Anterior hip precautions were reinforced. Overall the patient is very happy with their outcome so far. Followup in 3 weeks with repeat x-rays.

## 2022-09-16 NOTE — ADDENDUM
[FreeTextEntry1] : This note was authored by Suhas Paul working as a medical scribe for Dr. Duane Negron. The note was reviewed, edited, and revised by Dr. Duane Negron whom is in agreement with the exam findings, imaging findings, and treatment plan. 09/16/2022

## 2022-10-07 ENCOUNTER — APPOINTMENT (OUTPATIENT)
Dept: ORTHOPEDIC SURGERY | Facility: CLINIC | Age: 72
End: 2022-10-07

## 2022-10-07 VITALS
WEIGHT: 153 LBS | HEART RATE: 90 BPM | HEIGHT: 62 IN | DIASTOLIC BLOOD PRESSURE: 84 MMHG | BODY MASS INDEX: 28.16 KG/M2 | SYSTOLIC BLOOD PRESSURE: 131 MMHG

## 2022-10-07 PROCEDURE — 73502 X-RAY EXAM HIP UNI 2-3 VIEWS: CPT

## 2022-10-07 PROCEDURE — 99024 POSTOP FOLLOW-UP VISIT: CPT

## 2022-10-07 NOTE — HISTORY OF PRESENT ILLNESS
[de-identified] : S/P: Left robotic_assisted anterior total hip replacement with LAW. DOS: 8/29/22. \par \par  [de-identified] : MARCO ANTONIO TELLEZ is a 72 year female 6 weeks s/p left THR.  She is ambulating and transferring well without assistive devices. She reports no pain at this time. She reports continuing outpatient physical therapy twice a week with good improvement of strength. She continues aspirin for DVT prophylaxis. She  has returned to daily activities of life without significant pain or discomfort. Overall, she is very happy with the results of the surgery.		  [de-identified] : Exam of the left hip and contralateral iliac crest shows well healed incisions without signs of infection, hip flexion of 100 degrees, hip external rotation of 35 degrees, hip internal rotation of 20 degrees. Patient can perform a straight leg raise smoothly. 5/5 motor strength bilaterally distally. Sensation intact distally.		  [de-identified] :  X-ray: AP of the pelvis and 2 views of the left hip demonstrate a left total hip arthroplasty in stable position, with no evidence of fracture, loosening, or dislocation.		  [de-identified] : The patient is doing very well 6 weeks following anterior total hip replacement. Anterior hip precautions were reviewed and recommended to be followed for another 6 weeks. The patient will continue outpatient physical therapy and gradually transition to a home exercise program over the next 6 weeks. The patient may stop taking full strength aspirin on monday  and resume preoperative aspirin dosing if applicable. Dental prophylaxis was reviewed. The patient is doing very well so far and overall very happy with their progress. Follow up in 6 weeks.

## 2022-10-07 NOTE — ADDENDUM
[FreeTextEntry1] : This note was authored by Suhas Paul working as a medical scribe for Dr. Duane Negron. The note was reviewed, edited, and revised by Dr. Duane Negron whom is in agreement with the exam findings, imaging findings, and treatment plan. 10/07/2022

## 2022-11-18 ENCOUNTER — APPOINTMENT (OUTPATIENT)
Dept: ORTHOPEDIC SURGERY | Facility: CLINIC | Age: 72
End: 2022-11-18

## 2022-11-18 VITALS
HEART RATE: 90 BPM | HEIGHT: 62 IN | WEIGHT: 153 LBS | BODY MASS INDEX: 28.16 KG/M2 | DIASTOLIC BLOOD PRESSURE: 90 MMHG | SYSTOLIC BLOOD PRESSURE: 156 MMHG

## 2022-11-18 DIAGNOSIS — Z47.1 AFTERCARE FOLLOWING JOINT REPLACEMENT SURGERY: ICD-10-CM

## 2022-11-18 DIAGNOSIS — Z96.642 PRESENCE OF LEFT ARTIFICIAL HIP JOINT: ICD-10-CM

## 2022-11-18 DIAGNOSIS — Z96.649 PRESENCE OF UNSPECIFIED ARTIFICIAL HIP JOINT: ICD-10-CM

## 2022-11-18 PROCEDURE — 73502 X-RAY EXAM HIP UNI 2-3 VIEWS: CPT

## 2022-11-18 PROCEDURE — 99024 POSTOP FOLLOW-UP VISIT: CPT

## 2022-11-18 NOTE — HISTORY OF PRESENT ILLNESS
[de-identified] : S/P: Left robotic_assisted anterior total hip replacement with LAW. DOS: 8/29/22.  [de-identified] : MARCO ANTONIO TELLEZ is a 72 year female 3 months s/p left THR She reports continued outpatient physical therapy. She has returned to daily activities of life without significant pain or discomfort. Overall, she is very happy with the result of the surgery.		  [de-identified] : Exam of the left hip and contralateral iliac crest shows well healed incisions, hip flexion of 110 degrees, hip external rotation of 45 degrees, hip internal rotation of 30 degrees. Patient can perform a straight leg raise with good speed and strength.  5/5 motor strength bilaterally distally. Sensation intact distally.		  [de-identified] :  X-ray: AP of the pelvis and 2 views of the left hip demonstrate a left total hip arthroplasty in stable position, with no evidence of fracture, loosening, or dislocation.		  [de-identified] : The patient is doing very well 3 months following left total hip replacement. The patient will finish physical therapy and start home exercises. Overall she  is making excellent progress and is very happy with the result so far.  Dental prophylaxis was reviewed. Follow up one year post op for radiographic surveillance.

## 2022-11-18 NOTE — ADDENDUM
[FreeTextEntry1] : This note was authored by Suhas Paul working as a medical scribe for Dr. Duane Negron. The note was reviewed, edited, and revised by Dr. Duane Negron whom is in agreement with the exam findings, imaging findings, and treatment plan. 11/18/2022

## 2022-11-22 RX ORDER — AMOXICILLIN 500 MG/1
500 TABLET, FILM COATED ORAL
Qty: 4 | Refills: 5 | Status: ACTIVE | COMMUNITY
Start: 2022-11-22 | End: 1900-01-01

## 2023-04-18 ENCOUNTER — NON-APPOINTMENT (OUTPATIENT)
Age: 73
End: 2023-04-18

## 2023-05-23 ENCOUNTER — APPOINTMENT (OUTPATIENT)
Dept: OBGYN | Facility: CLINIC | Age: 73
End: 2023-05-23
Payer: MEDICARE

## 2023-05-23 ENCOUNTER — APPOINTMENT (OUTPATIENT)
Dept: DERMATOLOGY | Facility: CLINIC | Age: 73
End: 2023-05-23
Payer: MEDICARE

## 2023-05-23 VITALS
HEIGHT: 62 IN | BODY MASS INDEX: 26.68 KG/M2 | WEIGHT: 145 LBS | SYSTOLIC BLOOD PRESSURE: 148 MMHG | DIASTOLIC BLOOD PRESSURE: 79 MMHG

## 2023-05-23 DIAGNOSIS — Z01.419 ENCOUNTER FOR GYNECOLOGICAL EXAMINATION (GENERAL) (ROUTINE) W/OUT ABNORMAL FINDINGS: ICD-10-CM

## 2023-05-23 DIAGNOSIS — N81.6 CYSTOCELE, UNSPECIFIED: ICD-10-CM

## 2023-05-23 DIAGNOSIS — N39.3 STRESS INCONTINENCE (FEMALE) (MALE): ICD-10-CM

## 2023-05-23 DIAGNOSIS — Z12.39 ENCOUNTER FOR OTHER SCREENING FOR MALIGNANT NEOPLASM OF BREAST: ICD-10-CM

## 2023-05-23 DIAGNOSIS — N81.10 CYSTOCELE, UNSPECIFIED: ICD-10-CM

## 2023-05-23 DIAGNOSIS — Z78.0 ASYMPTOMATIC MENOPAUSAL STATE: ICD-10-CM

## 2023-05-23 PROCEDURE — 99397 PER PM REEVAL EST PAT 65+ YR: CPT | Mod: GY

## 2023-05-23 PROCEDURE — G0101: CPT

## 2023-05-23 PROCEDURE — 17000 DESTRUCT PREMALG LESION: CPT

## 2023-05-23 PROCEDURE — 99212 OFFICE O/P EST SF 10 MIN: CPT | Mod: 25

## 2023-05-23 NOTE — HISTORY OF PRESENT ILLNESS
[FreeTextEntry1] : 73 year old female presents for wwe.  She has no complaints today.  Menopause was in her 50's.  She denies menopausal symptoms.  She denies bowel symptoms.  She does admit to stress incontinence.  She denies further work up or treatment at this time.  She uses pads when needed.  She has a history of a cystocele and rectocele.  She is asymptomatic and declines further treatment.  She has a history of an abnormal pap and cryosurgery to her cervix.  She has no other significant gyn history.  She is due for her mammo.  She declines further bone density testing as she would not take meds.  Her last colonoscopy was in 2019 and she states she is overdue.  She has an appointment set up for the beginning of .  She would like to continue Paps every 2 years as she has a history of abnormal Paps and cryosurgery to her cervix.     \par \par PMH is significant for HTN and hypercholesterolemia.  PSH is significant for a rotator cuff surgery and a hip replacement.  Family history is significant for a maternal cousin with breast cancer at the age of 72.  She denies tobacco, alcohol and illicit drugs.  OB history  (FT VD x 3, one stillborn at 38 weeks).  Gyn history as above.  She takes crestor, probiotics and amlodipine.  NKDA.

## 2023-05-23 NOTE — PLAN
[FreeTextEntry1] : 73 year old female wwe\par \par 1. Patient with history of abnormal pap's and cryosurgery to the cervix.  Will continue pap's every 2 years.  Pap done today.\par 2. Rx screening mammo\par 3. Patient declines further bone density as she would not proceed with treatment\par 4. Due for screening colonoscopy, has appointment in the beginning of June\par 5. Counseled on stress incontinence.  Declines further work up.  Counseled on kegel exercises.  Counseled on pelvic floor PT.\par 6.  Cystocele and rectocele noted on pelvic exam.  She is currently asymptomatic and declines further treatment.\par 7.  Annual exam in 1 year

## 2023-05-30 LAB — CYTOLOGY CVX/VAG DOC THIN PREP: ABNORMAL

## 2023-09-21 ASSESSMENT — HOOS JR
IMPORTED FORM: YES
RISING FROM SITTING: SEVERE
IMPORTED LATERALITY: LEFT
WALKING ON UNEVEN SURFACE: MILD
LYING IN BED (TURNING OVER, MAINTAINING HIP POSITION): MILD
BENDING TO THE FLOOR TO PICK UP OBJECT: MODERATE
IMPORTED HOOS JR SCORE: 8.0
GOING UP OR DOWN STAIRS: MILD
HOOS JR RAW SCORE: 8

## 2024-06-29 ENCOUNTER — NON-APPOINTMENT (OUTPATIENT)
Age: 74
End: 2024-06-29

## 2024-06-29 ASSESSMENT — HOOS JR
HOOS JR RAW SCORE: 1
BENDING TO THE FLOOR TO PICK UP OBJECT: MILD

## 2024-12-10 ENCOUNTER — APPOINTMENT (OUTPATIENT)
Dept: OBGYN | Facility: CLINIC | Age: 74
End: 2024-12-10

## 2025-06-10 ENCOUNTER — APPOINTMENT (OUTPATIENT)
Dept: OBGYN | Facility: CLINIC | Age: 75
End: 2025-06-10
Payer: MEDICARE

## 2025-06-10 ENCOUNTER — APPOINTMENT (OUTPATIENT)
Dept: DERMATOLOGY | Facility: CLINIC | Age: 75
End: 2025-06-10
Payer: MEDICARE

## 2025-06-10 VITALS
HEIGHT: 62 IN | SYSTOLIC BLOOD PRESSURE: 120 MMHG | WEIGHT: 145 LBS | BODY MASS INDEX: 26.68 KG/M2 | DIASTOLIC BLOOD PRESSURE: 70 MMHG

## 2025-06-10 PROCEDURE — 99213 OFFICE O/P EST LOW 20 MIN: CPT | Mod: 25

## 2025-06-10 PROCEDURE — 17000 DESTRUCT PREMALG LESION: CPT

## 2025-06-10 PROCEDURE — 17003 DESTRUCT PREMALG LES 2-14: CPT

## 2025-06-10 PROCEDURE — G0101: CPT

## (undated) DEVICE — DRAPE U LONG 47X70"

## (undated) DEVICE — MAKO DRAPE KIT

## (undated) DEVICE — WAVEGUIDE ILLUMINATOR EIGR SABER W YANKAUER TAPERED TIP

## (undated) DEVICE — SPONGE RAYTEC 4X4 16PLY

## (undated) DEVICE — WRAP COMPRESSION CALF MED

## (undated) DEVICE — HOOD FLYTE STRYKER SURGICOOL W PEELAWAY

## (undated) DEVICE — FRAZIER SUCTION TIP 10FR

## (undated) DEVICE — GLV 8 DURAPRENE

## (undated) DEVICE — DRAPE IOBAN 17X23"

## (undated) DEVICE — SYR LUER LOK 30CC

## (undated) DEVICE — BLANKET WARMER UPPER ADULT

## (undated) DEVICE — KIT TRACKING HIP PROC HIZADISC STRL

## (undated) DEVICE — PACK TOTAL HIP CUST

## (undated) DEVICE — DRSG IMMOBILIZER SHOULDER QUICK RELEASE LG

## (undated) DEVICE — GLV COTTON LG STERILE

## (undated) DEVICE — DRSG KERLIX MED 6"

## (undated) DEVICE — SUT SILK 2-0 18" TIES

## (undated) DEVICE — SUT ETHIBOND 2 30" V37

## (undated) DEVICE — NDL HYPO SAFE 22G X 1.5"

## (undated) DEVICE — NDL HYPO SAFE 20G X 1.5"

## (undated) DEVICE — SYR ASEPTO

## (undated) DEVICE — SUT VICRYL 3-0 18" SH UNDYED

## (undated) DEVICE — SUT MONOCRYL 3-0 27" PS-2 UNDYED

## (undated) DEVICE — POSITIONER FOAM EGG CRATE ULNAR (2PCS)

## (undated) DEVICE — DRAPE SURGICAL #1010

## (undated) DEVICE — GLV 8 PROTEXIS

## (undated) DEVICE — DRAPE 3/4 SHEET 52X76"

## (undated) DEVICE — SUT DERMABOND 0.7ML

## (undated) DEVICE — DRAPE IOBAN 23X33"

## (undated) DEVICE — DRSG COBAN 4"

## (undated) DEVICE — SUT VICRYL 2-0 27" CT-2 UNDYED

## (undated) DEVICE — SUT STRATAFIX SPIRAL PDO 1 36CM CTX

## (undated) DEVICE — SOL BETADINE POUCH 0.75OZ STERILE

## (undated) DEVICE — IRRISEPT JET LAVAGE W 0.05 PCT CHG

## (undated) DEVICE — DRAPE C ARM UNIVERSAL

## (undated) DEVICE — DRAPE HALF SHEET 40X57"

## (undated) DEVICE — ELCTR EDGE BOVIE COATED BLADE TIP 4"

## (undated) DEVICE — SEALER BIPOLAR 6.0 AQUAMANTYS

## (undated) DEVICE — DRAPE SHEET XL 77X98"

## (undated) DEVICE — DRAPE TOWEL BLUE STICKY